# Patient Record
Sex: FEMALE | Race: WHITE | NOT HISPANIC OR LATINO | Employment: OTHER | URBAN - METROPOLITAN AREA
[De-identification: names, ages, dates, MRNs, and addresses within clinical notes are randomized per-mention and may not be internally consistent; named-entity substitution may affect disease eponyms.]

---

## 2018-06-12 ENCOUNTER — CLINICAL SUPPORT (OUTPATIENT)
Dept: AUDIOLOGY | Facility: CLINIC | Age: 63
End: 2018-06-12
Payer: MEDICARE

## 2018-06-12 ENCOUNTER — OFFICE VISIT (OUTPATIENT)
Dept: OTOLARYNGOLOGY | Facility: CLINIC | Age: 63
End: 2018-06-12
Payer: MEDICARE

## 2018-06-12 VITALS — WEIGHT: 260.13 LBS

## 2018-06-12 DIAGNOSIS — H90.6 MIXED CONDUCTIVE AND SENSORINEURAL HEARING LOSS OF BOTH EARS: Primary | ICD-10-CM

## 2018-06-12 DIAGNOSIS — H93.13 TINNITUS OF BOTH EARS: ICD-10-CM

## 2018-06-12 DIAGNOSIS — H90.6 MIXED CONDUCTIVE AND SENSORINEURAL HEARING LOSS, BILATERAL: Primary | ICD-10-CM

## 2018-06-12 DIAGNOSIS — H60.541 ECZEMATOID OTITIS EXTERNA OF RIGHT EAR, UNSPECIFIED CHRONICITY: ICD-10-CM

## 2018-06-12 DIAGNOSIS — H61.22 IMPACTED CERUMEN OF LEFT EAR: ICD-10-CM

## 2018-06-12 DIAGNOSIS — H93.13 TINNITUS, BILATERAL: ICD-10-CM

## 2018-06-12 PROCEDURE — 92567 TYMPANOMETRY: CPT | Mod: PBBFAC | Performed by: AUDIOLOGIST-HEARING AID FITTER

## 2018-06-12 PROCEDURE — 92557 COMPREHENSIVE HEARING TEST: CPT | Mod: PBBFAC | Performed by: AUDIOLOGIST-HEARING AID FITTER

## 2018-06-12 PROCEDURE — 99204 OFFICE O/P NEW MOD 45 MIN: CPT | Mod: 25,S$PBB,, | Performed by: PHYSICIAN ASSISTANT

## 2018-06-12 PROCEDURE — 99213 OFFICE O/P EST LOW 20 MIN: CPT | Mod: PBBFAC,25 | Performed by: PHYSICIAN ASSISTANT

## 2018-06-12 PROCEDURE — 69210 REMOVE IMPACTED EAR WAX UNI: CPT | Mod: PBBFAC | Performed by: PHYSICIAN ASSISTANT

## 2018-06-12 PROCEDURE — 99999 PR PBB SHADOW E&M-EST. PATIENT-LVL III: CPT | Mod: PBBFAC,,, | Performed by: PHYSICIAN ASSISTANT

## 2018-06-12 PROCEDURE — 69210 REMOVE IMPACTED EAR WAX UNI: CPT | Mod: S$PBB,,, | Performed by: PHYSICIAN ASSISTANT

## 2018-06-12 RX ORDER — LOSARTAN POTASSIUM 100 MG/1
100 TABLET ORAL DAILY
COMMUNITY
Start: 2016-03-08 | End: 2021-11-15 | Stop reason: SDUPTHER

## 2018-06-12 RX ORDER — ZIPRASIDONE HYDROCHLORIDE 80 MG/1
80 CAPSULE ORAL NIGHTLY
COMMUNITY
Start: 2016-09-05

## 2018-06-12 RX ORDER — LEVOTHYROXINE SODIUM 150 UG/1
150 TABLET ORAL DAILY
COMMUNITY
Start: 2017-12-27 | End: 2021-10-05 | Stop reason: ALTCHOICE

## 2018-06-12 RX ORDER — CARVEDILOL 12.5 MG/1
6.25 TABLET ORAL 2 TIMES DAILY
COMMUNITY
End: 2021-07-26 | Stop reason: ALTCHOICE

## 2018-06-12 RX ORDER — ZOLPIDEM TARTRATE 10 MG/1
10 TABLET ORAL NIGHTLY PRN
COMMUNITY
End: 2021-10-05

## 2018-06-12 RX ORDER — BUSPIRONE HYDROCHLORIDE 10 MG/1
10 TABLET ORAL 2 TIMES DAILY
COMMUNITY

## 2018-06-12 RX ORDER — ZIPRASIDONE HYDROCHLORIDE 20 MG/1
20 CAPSULE ORAL DAILY
COMMUNITY

## 2018-06-12 RX ORDER — METFORMIN HYDROCHLORIDE 500 MG/1
1 TABLET ORAL 2 TIMES DAILY
COMMUNITY
Start: 2017-12-27 | End: 2021-07-26 | Stop reason: ALTCHOICE

## 2018-06-12 RX ORDER — LIOTHYRONINE SODIUM 25 UG/1
12.5 TABLET ORAL DAILY
COMMUNITY
Start: 2017-07-26 | End: 2021-07-26 | Stop reason: ALTCHOICE

## 2018-06-12 RX ORDER — AMLODIPINE BESYLATE 5 MG/1
1 TABLET ORAL DAILY
Refills: 3 | COMMUNITY
Start: 2018-03-28 | End: 2021-09-15 | Stop reason: SDUPTHER

## 2018-06-12 RX ORDER — SIMVASTATIN 10 MG/1
1 TABLET, FILM COATED ORAL DAILY
COMMUNITY
Start: 2016-08-17 | End: 2021-07-26 | Stop reason: SDUPTHER

## 2018-06-12 RX ORDER — TRIAMCINOLONE ACETONIDE 1 MG/G
CREAM TOPICAL 2 TIMES DAILY
Qty: 15 G | Refills: 1 | Status: SHIPPED | OUTPATIENT
Start: 2018-06-12 | End: 2021-07-26 | Stop reason: ALTCHOICE

## 2018-06-12 NOTE — PROGRESS NOTES
Maria Guadalupe Pope was seen 06/12/2018 for an audiological evaluation.  Patient is accompanied by her .  She reports dryness and swelling of both ear canals for past few weeks; seemed better with Neosporin.  She reports hearing loss that has been gradually progressing over the last 2 years.  She has noted any difference in hearing between the ears, with the right ear being the better hearing ear.  She has noted occasional tinnitus (dull ring) in both ears.  She has not had any recent issues with ear pain or ear drainage.  She has a family history of hearing loss, and has not had any previous otologic surgery.  She denies any history of significant loud noise exposure. She denies issues with dizziness.    Results reveal a normal-to-moderate mixed hearing loss 250-8000 Hz for the right ear, and a normal-to-severe mixed hearing loss 250-8000 Hz for the left ear.   Speech Reception Thresholds were  25 dBHL for the right ear and 30 dBHL for the left ear.   Word recognition scores were excellent for the right ear and good for the left ear.   Tympanograms were Type Ad for the right ear and Type Ad for the left ear.    Patient was counseled on the above findings.    Recommendations:  1. ENT  2. Hearing aids, if and when motivated.  Information packet was provided.  3. Annual audiograms

## 2018-06-12 NOTE — PROGRESS NOTES
Subjective:       Patient ID: Maria Guadalupe Pope is a 62 y.o. female.    Chief Complaint: Ear Fullness and Hearing Loss    Patient is a very pleasant 62 y.o. female here to see me today for the first time for evaluation of her ears.  She reports dryness and swelling of both ear canals for past few weeks; seemed better with Neosporin.  She reports hearing loss that has been gradually progressing over the last 2 years.  She has noted any difference in hearing between the ears, with the right ear being the better hearing ear.  She has noted occasional tinnitus (dull ring) in both ears.  She has not had any recent issues with ear pain or ear drainage.  She says both ears are itchy at times and she uses Qtips after showering.  She has a family history of hearing loss, and has not had any previous otologic surgery.  She denies any history of significant loud noise exposure. She denies issues with dizziness.        Review of Systems   Constitutional: Negative for activity change, appetite change and fever.   HENT: Positive for hearing loss and tinnitus. Negative for congestion, ear discharge, ear pain (itchy AU), nosebleeds, postnasal drip, rhinorrhea, sinus pressure, sore throat and trouble swallowing.    Eyes: Negative for discharge.   Respiratory: Negative for cough, shortness of breath and wheezing.    Cardiovascular: Negative for chest pain and palpitations.   Gastrointestinal: Negative for diarrhea, nausea and vomiting.   Musculoskeletal: Negative for gait problem.   Allergic/Immunologic: Negative for food allergies.   Neurological: Negative for dizziness, light-headedness and headaches.   Hematological: Negative for adenopathy.   Psychiatric/Behavioral: Negative for confusion.       Objective:      Physical Exam   Constitutional: She is oriented to person, place, and time. She appears well-developed and well-nourished. She is cooperative. No distress.   HENT:   Head: Normocephalic and atraumatic.   Right Ear:  Tympanic membrane, external ear and ear canal normal. There is swelling (minimal at EAC meatus ). No tenderness. No middle ear effusion.   Left Ear: External ear and ear canal normal.   Nose: Nose normal. No mucosal edema, rhinorrhea, nasal deformity or septal deviation. No epistaxis. Right sinus exhibits no maxillary sinus tenderness and no frontal sinus tenderness. Left sinus exhibits no maxillary sinus tenderness and no frontal sinus tenderness.   Mouth/Throat: Uvula is midline, oropharynx is clear and moist and mucous membranes are normal. Mucous membranes are not pale and not dry. No trismus in the jaw. Normal dentition. No uvula swelling. No oropharyngeal exudate or posterior oropharyngeal erythema.   Left cerumen impaction (removal described below); right EAC meatus with dry, flaky skin noted with very mild edema; not erythematous; few tiny excoriations noted   Eyes: Conjunctivae, EOM and lids are normal. Pupils are equal, round, and reactive to light. Right eye exhibits no chemosis. Left eye exhibits no chemosis. Right conjunctiva is not injected. Left conjunctiva is not injected. No scleral icterus. Right eye exhibits normal extraocular motion and no nystagmus. Left eye exhibits normal extraocular motion and no nystagmus.   Neck: Trachea normal and phonation normal. No tracheal tenderness present. No tracheal deviation present. No thyroid mass and no thyromegaly present.   Cardiovascular: Intact distal pulses.    Pulmonary/Chest: Effort normal. No stridor. No respiratory distress.   Abdominal: She exhibits no distension.   Lymphadenopathy:        Head (right side): No submental, no submandibular, no preauricular and no posterior auricular adenopathy present.        Head (left side): No submental, no submandibular, no preauricular and no posterior auricular adenopathy present.     She has no cervical adenopathy.   Neurological: She is alert and oriented to person, place, and time. No cranial nerve deficit.    Skin: Skin is warm and dry. No rash noted. No erythema.   Psychiatric: She has a normal mood and affect. Her behavior is normal.         Procedure Note    CHIEF COMPLAINT:  Cerumen Impaction    Description:  The patient was seated in an exam chair.  An ear speculum was placed in the left EAC and was examined under the microscope.  Suction and/or loop curettes were used to remove a large cerumen impaction.  The tympanic membrane was visualized and was normal in appearance.  The patient tolerated the procedure well.      AUDIOGRAM:          Assessment:       1. Mixed conductive and sensorineural hearing loss of both ears    2. Tinnitus of both ears    3. Eczematoid otitis externa of right ear, unspecified chronicity    4. Impacted cerumen of left ear        Plan:         We reviewed the patient's recent audiogram and hearing loss in detail.  Recommend annual audiograms.  We also discussed the use hearing protection when exposed to loud noise, including lawn equipment.      We reviewed her audiogram together in detail.  We also discussed that tinnitus is most often caused by a hearing loss, and that as the hair cells are damaged, either genetic or as a result of loud noise exposure, they then cause tinnitus.  Some patients find that restricting the salt or caffeine in their diet helps, and there is also an OTC supplement, lipoflavinoids, that some people find to be effective though their benefit is not fully proven.  Tinnitus tends to be louder in times of stress and fatigue, and may decrease with time.  Sound machines may also be an effective masking technique if needed at night.    Recommend topical steroid cream (TAC 0.1%) to external ears PRN itching and flaking.  We discussed intermittent use as long-term use can lead to fungal infection of the ears.  We discussed the need for dry ear precautions.  For maintenance therapy, I recommend a mixture of distilled vinegar and alcohol.      Cerumen impaction:  Removed  today without difficulty.  I would recommend the use of a wax softening drop, either over the counter Debrox or mineral oil, on a weekly basis.  I also instructed the patient to avoid Qtips.

## 2021-02-24 ENCOUNTER — IMMUNIZATION (OUTPATIENT)
Dept: OBSTETRICS AND GYNECOLOGY | Facility: CLINIC | Age: 66
End: 2021-02-24
Payer: MEDICARE

## 2021-02-24 DIAGNOSIS — Z23 NEED FOR VACCINATION: Primary | ICD-10-CM

## 2021-02-24 PROCEDURE — 0001A COVID-19, MRNA, LNP-S, PF, 30 MCG/0.3 ML DOSE VACCINE: CPT | Mod: CV19,,, | Performed by: ANESTHESIOLOGY

## 2021-02-24 PROCEDURE — 91300 COVID-19, MRNA, LNP-S, PF, 30 MCG/0.3 ML DOSE VACCINE: CPT | Mod: ,,, | Performed by: ANESTHESIOLOGY

## 2021-02-24 PROCEDURE — 91300 COVID-19, MRNA, LNP-S, PF, 30 MCG/0.3 ML DOSE VACCINE: ICD-10-PCS | Mod: ,,, | Performed by: ANESTHESIOLOGY

## 2021-02-24 PROCEDURE — 0001A COVID-19, MRNA, LNP-S, PF, 30 MCG/0.3 ML DOSE VACCINE: ICD-10-PCS | Mod: CV19,,, | Performed by: ANESTHESIOLOGY

## 2021-03-17 ENCOUNTER — IMMUNIZATION (OUTPATIENT)
Dept: OBSTETRICS AND GYNECOLOGY | Facility: CLINIC | Age: 66
End: 2021-03-17
Payer: MEDICARE

## 2021-03-17 DIAGNOSIS — Z23 NEED FOR VACCINATION: Primary | ICD-10-CM

## 2021-03-17 PROCEDURE — 91300 COVID-19, MRNA, LNP-S, PF, 30 MCG/0.3 ML DOSE VACCINE: CPT | Mod: ,,, | Performed by: ANESTHESIOLOGY

## 2021-03-17 PROCEDURE — 0002A COVID-19, MRNA, LNP-S, PF, 30 MCG/0.3 ML DOSE VACCINE: ICD-10-PCS | Mod: CV19,,, | Performed by: ANESTHESIOLOGY

## 2021-03-17 PROCEDURE — 91300 COVID-19, MRNA, LNP-S, PF, 30 MCG/0.3 ML DOSE VACCINE: ICD-10-PCS | Mod: ,,, | Performed by: ANESTHESIOLOGY

## 2021-03-17 PROCEDURE — 0002A COVID-19, MRNA, LNP-S, PF, 30 MCG/0.3 ML DOSE VACCINE: CPT | Mod: CV19,,, | Performed by: ANESTHESIOLOGY

## 2021-06-10 ENCOUNTER — HOSPITAL ENCOUNTER (OUTPATIENT)
Dept: RADIOLOGY | Facility: HOSPITAL | Age: 66
Discharge: HOME | End: 2021-06-10
Attending: INTERNAL MEDICINE
Payer: MEDICARE

## 2021-06-10 DIAGNOSIS — R60.9 SWELLING: Primary | ICD-10-CM

## 2021-06-10 DIAGNOSIS — R60.9 SWELLING: ICD-10-CM

## 2021-06-10 PROCEDURE — 93971 EXTREMITY STUDY: CPT | Mod: TC,LT

## 2021-07-26 PROBLEM — E66.09 OBESITY DUE TO EXCESS CALORIES: Status: ACTIVE | Noted: 2021-07-26

## 2021-07-26 PROBLEM — J18.9 PNEUMONIA: Status: ACTIVE | Noted: 2021-07-26

## 2021-08-24 ENCOUNTER — LAB VISIT (OUTPATIENT)
Dept: LAB | Facility: HOSPITAL | Age: 66
End: 2021-08-24
Attending: INTERNAL MEDICINE
Payer: MEDICARE

## 2021-08-24 DIAGNOSIS — E03.9 MYXEDEMA HEART DISEASE: ICD-10-CM

## 2021-08-24 DIAGNOSIS — I51.9 MYXEDEMA HEART DISEASE: ICD-10-CM

## 2021-08-24 DIAGNOSIS — I50.20 SYSTOLIC HEART FAILURE, UNSPECIFIED HF CHRONICITY: ICD-10-CM

## 2021-08-24 DIAGNOSIS — E78.2 MIXED HYPERLIPIDEMIA: Primary | ICD-10-CM

## 2021-08-24 DIAGNOSIS — I10 ESSENTIAL HYPERTENSION, BENIGN: ICD-10-CM

## 2021-08-24 LAB
ALBUMIN SERPL BCP-MCNC: 3.3 G/DL (ref 3.5–5.2)
ALP SERPL-CCNC: 71 U/L (ref 55–135)
ALT SERPL W/O P-5'-P-CCNC: 37 U/L (ref 10–44)
ANION GAP SERPL CALC-SCNC: 7 MMOL/L (ref 8–16)
AST SERPL-CCNC: 20 U/L (ref 10–40)
BASOPHILS # BLD AUTO: 0.01 K/UL (ref 0–0.2)
BASOPHILS NFR BLD: 0.2 % (ref 0–1.9)
BILIRUB SERPL-MCNC: 0.4 MG/DL (ref 0.1–1)
BUN SERPL-MCNC: 8 MG/DL (ref 8–23)
CALCIUM SERPL-MCNC: 9.4 MG/DL (ref 8.7–10.5)
CHLORIDE SERPL-SCNC: 108 MMOL/L (ref 95–110)
CHOLEST SERPL-MCNC: 160 MG/DL (ref 120–199)
CHOLEST/HDLC SERPL: 3.5 {RATIO} (ref 2–5)
CO2 SERPL-SCNC: 28 MMOL/L (ref 23–29)
CREAT SERPL-MCNC: 1.6 MG/DL (ref 0.5–1.4)
DIFFERENTIAL METHOD: ABNORMAL
EOSINOPHIL # BLD AUTO: 0.3 K/UL (ref 0–0.5)
EOSINOPHIL NFR BLD: 6.1 % (ref 0–8)
ERYTHROCYTE [DISTWIDTH] IN BLOOD BY AUTOMATED COUNT: 14.5 % (ref 11.5–14.5)
EST. GFR  (AFRICAN AMERICAN): 38.7 ML/MIN/1.73 M^2
EST. GFR  (NON AFRICAN AMERICAN): 33.6 ML/MIN/1.73 M^2
GLUCOSE SERPL-MCNC: 135 MG/DL (ref 70–110)
HCT VFR BLD AUTO: 40.6 % (ref 37–48.5)
HDLC SERPL-MCNC: 46 MG/DL (ref 40–75)
HDLC SERPL: 28.8 % (ref 20–50)
HGB BLD-MCNC: 13.2 G/DL (ref 12–16)
IMM GRANULOCYTES # BLD AUTO: 0.03 K/UL (ref 0–0.04)
IMM GRANULOCYTES NFR BLD AUTO: 0.6 % (ref 0–0.5)
LDLC SERPL CALC-MCNC: 75.2 MG/DL (ref 63–159)
LYMPHOCYTES # BLD AUTO: 1.4 K/UL (ref 1–4.8)
LYMPHOCYTES NFR BLD: 27.9 % (ref 18–48)
MCH RBC QN AUTO: 31.4 PG (ref 27–31)
MCHC RBC AUTO-ENTMCNC: 32.5 G/DL (ref 32–36)
MCV RBC AUTO: 96 FL (ref 82–98)
MONOCYTES # BLD AUTO: 0.4 K/UL (ref 0.3–1)
MONOCYTES NFR BLD: 7.7 % (ref 4–15)
NEUTROPHILS # BLD AUTO: 2.9 K/UL (ref 1.8–7.7)
NEUTROPHILS NFR BLD: 57.5 % (ref 38–73)
NONHDLC SERPL-MCNC: 114 MG/DL
NRBC BLD-RTO: 0 /100 WBC
PLATELET # BLD AUTO: 177 K/UL (ref 150–450)
PMV BLD AUTO: 9.5 FL (ref 9.2–12.9)
POTASSIUM SERPL-SCNC: 4.4 MMOL/L (ref 3.5–5.1)
PROT SERPL-MCNC: 7 G/DL (ref 6–8.4)
RBC # BLD AUTO: 4.21 M/UL (ref 4–5.4)
SODIUM SERPL-SCNC: 143 MMOL/L (ref 136–145)
T4 FREE SERPL-MCNC: 0.85 NG/DL (ref 0.71–1.51)
TRIGL SERPL-MCNC: 194 MG/DL (ref 30–150)
TSH SERPL DL<=0.005 MIU/L-ACNC: 7.4 UIU/ML (ref 0.4–4)
WBC # BLD AUTO: 5.09 K/UL (ref 3.9–12.7)

## 2021-08-24 PROCEDURE — 85025 COMPLETE CBC W/AUTO DIFF WBC: CPT | Performed by: INTERNAL MEDICINE

## 2021-08-24 PROCEDURE — 36415 COLL VENOUS BLD VENIPUNCTURE: CPT | Performed by: INTERNAL MEDICINE

## 2021-08-24 PROCEDURE — 80053 COMPREHEN METABOLIC PANEL: CPT | Performed by: INTERNAL MEDICINE

## 2021-08-24 PROCEDURE — 84443 ASSAY THYROID STIM HORMONE: CPT | Performed by: INTERNAL MEDICINE

## 2021-08-24 PROCEDURE — 80061 LIPID PANEL: CPT | Performed by: INTERNAL MEDICINE

## 2021-08-24 PROCEDURE — 84439 ASSAY OF FREE THYROXINE: CPT | Performed by: INTERNAL MEDICINE

## 2021-09-08 PROBLEM — U07.1 COVID-19: Status: ACTIVE | Noted: 2021-09-08

## 2021-09-08 PROBLEM — E78.2 ELEVATED CHOLESTEROL WITH HIGH TRIGLYCERIDES: Status: ACTIVE | Noted: 2021-09-08

## 2021-09-08 PROBLEM — E03.9 HYPOTHYROIDISM: Status: ACTIVE | Noted: 2021-09-08

## 2021-10-22 ENCOUNTER — IMMUNIZATION (OUTPATIENT)
Dept: OBSTETRICS AND GYNECOLOGY | Facility: CLINIC | Age: 66
End: 2021-10-22
Payer: MEDICARE

## 2021-10-22 DIAGNOSIS — Z23 NEED FOR VACCINATION: Primary | ICD-10-CM

## 2021-10-22 PROCEDURE — 0003A COVID-19, MRNA, LNP-S, PF, 30 MCG/0.3 ML DOSE VACCINE: CPT | Mod: PBBFAC | Performed by: ANESTHESIOLOGY

## 2021-10-22 PROCEDURE — 91300 COVID-19, MRNA, LNP-S, PF, 30 MCG/0.3 ML DOSE VACCINE: CPT | Mod: PBBFAC

## 2021-12-02 PROBLEM — E88.89 DEFICIENCY OF COENZYME Q10: Status: ACTIVE | Noted: 2021-12-02

## 2021-12-02 PROBLEM — N18.32 STAGE 3B CHRONIC KIDNEY DISEASE: Status: ACTIVE | Noted: 2021-12-02

## 2021-12-02 PROBLEM — Z00.00 ROUTINE GENERAL MEDICAL EXAMINATION AT A HEALTH CARE FACILITY: Status: ACTIVE | Noted: 2021-12-02

## 2021-12-02 PROBLEM — I10 HYPERTENSION: Status: ACTIVE | Noted: 2021-12-02

## 2021-12-02 PROBLEM — R73.01 IFG (IMPAIRED FASTING GLUCOSE): Status: ACTIVE | Noted: 2021-12-02

## 2021-12-02 PROBLEM — E63.0 ESSENTIAL FATTY ACID (EFA) DEFICIENCY: Status: ACTIVE | Noted: 2021-12-02

## 2022-03-07 PROBLEM — Z00.00 ROUTINE GENERAL MEDICAL EXAMINATION AT A HEALTH CARE FACILITY: Status: RESOLVED | Noted: 2021-12-02 | Resolved: 2022-03-07

## 2022-05-12 ENCOUNTER — OFFICE VISIT (OUTPATIENT)
Dept: ENDOCRINOLOGY | Facility: CLINIC | Age: 67
End: 2022-05-12
Payer: MEDICARE

## 2022-05-12 VITALS
BODY MASS INDEX: 46.06 KG/M2 | HEIGHT: 65 IN | WEIGHT: 276.44 LBS | DIASTOLIC BLOOD PRESSURE: 72 MMHG | SYSTOLIC BLOOD PRESSURE: 138 MMHG

## 2022-05-12 DIAGNOSIS — E78.2 ELEVATED CHOLESTEROL WITH HIGH TRIGLYCERIDES: ICD-10-CM

## 2022-05-12 DIAGNOSIS — E03.8 HYPOTHYROIDISM DUE TO HASHIMOTO'S THYROIDITIS: Primary | ICD-10-CM

## 2022-05-12 DIAGNOSIS — E06.3 HYPOTHYROIDISM DUE TO HASHIMOTO'S THYROIDITIS: Primary | ICD-10-CM

## 2022-05-12 DIAGNOSIS — N18.32 STAGE 3B CHRONIC KIDNEY DISEASE: ICD-10-CM

## 2022-05-12 PROCEDURE — 99999 PR PBB SHADOW E&M-EST. PATIENT-LVL III: ICD-10-PCS | Mod: PBBFAC,,, | Performed by: STUDENT IN AN ORGANIZED HEALTH CARE EDUCATION/TRAINING PROGRAM

## 2022-05-12 PROCEDURE — 99999 PR PBB SHADOW E&M-EST. PATIENT-LVL III: CPT | Mod: PBBFAC,,, | Performed by: STUDENT IN AN ORGANIZED HEALTH CARE EDUCATION/TRAINING PROGRAM

## 2022-05-12 PROCEDURE — 99213 OFFICE O/P EST LOW 20 MIN: CPT | Mod: PBBFAC,PN | Performed by: STUDENT IN AN ORGANIZED HEALTH CARE EDUCATION/TRAINING PROGRAM

## 2022-05-12 PROCEDURE — 99204 OFFICE O/P NEW MOD 45 MIN: CPT | Mod: S$PBB,,, | Performed by: STUDENT IN AN ORGANIZED HEALTH CARE EDUCATION/TRAINING PROGRAM

## 2022-05-12 PROCEDURE — 99204 PR OFFICE/OUTPT VISIT, NEW, LEVL IV, 45-59 MIN: ICD-10-PCS | Mod: S$PBB,,, | Performed by: STUDENT IN AN ORGANIZED HEALTH CARE EDUCATION/TRAINING PROGRAM

## 2022-05-12 RX ORDER — CELECOXIB 100 MG/1
CAPSULE ORAL
COMMUNITY

## 2022-05-12 NOTE — PROGRESS NOTES
"Subjective:      Patient ID: Maria Guadalupe Pope is a 66 y.o. female.    Chief Complaint:  Hypothyroidism    History of Present Illness  This is a 66 y.o. female. with a past medical history of hypothyroidism here for evaluation.    Hypothyroidism  Diagnosed in her 20s    Etiology: Autoimmune    Currently on levothyroxine 175 mcg daily and Cytomel 12.5 mcg daily  Reports takes at least 30 min before eating or drinking anything other than water.   Reports taking separate from multivitamins by at least 4 hours    Lab Results   Component Value Date    TSH 7.400 (H) 08/24/2021     Outside labs (11/16/21)  TSH 0.6  FT4 1.25    She reports having issues with different levothyroxine brands  At some point was on Euthyrox which did not work  Only 1 week ago was able to get levothyroxine generic which is the one that has worked as per her    Reports some neck tightness at times    Lithium use: No  Biotin use: No    Thyroid US: not recently    Review of Systems  As above    Social and family history reviewed  Current medications and allergies reviewed    Objective:   /72 (BP Location: Right arm, Patient Position: Sitting)   Ht 5' 5" (1.651 m)   Wt 125.4 kg (276 lb 7.3 oz)   BMI 46.00 kg/m²   Physical Exam  Alert, oriented  No thyromegaly    BP Readings from Last 1 Encounters:   05/12/22 138/72      Wt Readings from Last 1 Encounters:   05/12/22 1419 125.4 kg (276 lb 7.3 oz)     Body mass index is 46 kg/m².    Lab Review:   Lab Results   Component Value Date    HGBA1C 5.8 (H) 11/16/2021     Lab Results   Component Value Date    CHOL 144 11/16/2021    HDL 38 (L) 11/16/2021    LDLCALC 79 11/16/2021    TRIG 156 (H) 11/16/2021    CHOLHDL 28.8 08/24/2021     Lab Results   Component Value Date     11/16/2021    K 4.8 11/16/2021     11/16/2021    CO2 29 11/16/2021     (H) 11/16/2021    BUN 14 11/16/2021    CREATININE 1.51 (H) 11/16/2021    CALCIUM 9.7 11/16/2021    PROT 7.0 08/24/2021    ALBUMIN 4.3 " 11/16/2021    BILITOT 0.4 11/16/2021    ALKPHOS 71 08/24/2021    AST 27 11/16/2021    ALT 22 11/16/2021    ANIONGAP 7 (L) 08/24/2021    ESTGFRAFRICA 38.7 (A) 08/24/2021    EGFRNONAA 36 (L) 11/16/2021    TSH 7.400 (H) 08/24/2021       All pertinent labs reviewed    Assessment and Plan     Hypothyroidism  No recent TSH  Given recent changes in formulation will avoid checking TSH now and will wait until 6 weeks after she started new regimen/formulation  For now continue same regimen as prescribed  She is having some neck/throat tightness, no goiter on exam, will perform US    Plan  - Check TSH in 5 weeks with follow up a few days after  - Thyroid US    Elevated cholesterol with high triglycerides  On statin per PCP  Optimize thyroid function as above    Stage 3b chronic kidney disease  Continue antihypertensive regimen including ARB/ACEi      Follow-up in 6 weeks    Supa Arias MD  Endocrinology

## 2022-05-12 NOTE — ASSESSMENT & PLAN NOTE
No recent TSH  Given recent changes in formulation will avoid checking TSH now and will wait until 6 weeks after she started new regimen/formulation  For now continue same regimen as prescribed  She is having some neck/throat tightness, no goiter on exam, will perform US    Plan  - Check TSH in 5 weeks with follow up a few days after  - Thyroid US

## 2022-05-17 ENCOUNTER — HOSPITAL ENCOUNTER (OUTPATIENT)
Dept: RADIOLOGY | Facility: HOSPITAL | Age: 67
Discharge: HOME | End: 2022-05-17
Attending: STUDENT IN AN ORGANIZED HEALTH CARE EDUCATION/TRAINING PROGRAM
Payer: MEDICARE

## 2022-05-17 DIAGNOSIS — E03.8 HYPOTHYROIDISM DUE TO HASHIMOTO'S THYROIDITIS: ICD-10-CM

## 2022-05-17 DIAGNOSIS — E06.3 HYPOTHYROIDISM DUE TO HASHIMOTO'S THYROIDITIS: ICD-10-CM

## 2022-05-17 PROCEDURE — 76536 US EXAM OF HEAD AND NECK: CPT | Mod: TC

## 2022-06-17 ENCOUNTER — LAB VISIT (OUTPATIENT)
Dept: LAB | Facility: HOSPITAL | Age: 67
End: 2022-06-17
Attending: STUDENT IN AN ORGANIZED HEALTH CARE EDUCATION/TRAINING PROGRAM
Payer: MEDICARE

## 2022-06-17 DIAGNOSIS — E06.3 HYPOTHYROIDISM DUE TO HASHIMOTO'S THYROIDITIS: ICD-10-CM

## 2022-06-17 DIAGNOSIS — E03.8 HYPOTHYROIDISM DUE TO HASHIMOTO'S THYROIDITIS: ICD-10-CM

## 2022-06-17 LAB
T4 FREE SERPL-MCNC: 1.18 NG/DL (ref 0.71–1.51)
TSH SERPL DL<=0.005 MIU/L-ACNC: 0.02 UIU/ML (ref 0.4–4)

## 2022-06-17 PROCEDURE — 36415 COLL VENOUS BLD VENIPUNCTURE: CPT | Performed by: STUDENT IN AN ORGANIZED HEALTH CARE EDUCATION/TRAINING PROGRAM

## 2022-06-17 PROCEDURE — 84443 ASSAY THYROID STIM HORMONE: CPT | Performed by: STUDENT IN AN ORGANIZED HEALTH CARE EDUCATION/TRAINING PROGRAM

## 2022-06-17 PROCEDURE — 84439 ASSAY OF FREE THYROXINE: CPT | Performed by: STUDENT IN AN ORGANIZED HEALTH CARE EDUCATION/TRAINING PROGRAM

## 2022-06-21 ENCOUNTER — OFFICE VISIT (OUTPATIENT)
Dept: ENDOCRINOLOGY | Facility: CLINIC | Age: 67
End: 2022-06-21
Payer: MEDICARE

## 2022-06-21 VITALS
DIASTOLIC BLOOD PRESSURE: 82 MMHG | HEIGHT: 65 IN | SYSTOLIC BLOOD PRESSURE: 134 MMHG | BODY MASS INDEX: 45.43 KG/M2 | WEIGHT: 272.69 LBS

## 2022-06-21 DIAGNOSIS — E03.9 ACQUIRED HYPOTHYROIDISM: Primary | ICD-10-CM

## 2022-06-21 DIAGNOSIS — E78.2 ELEVATED CHOLESTEROL WITH HIGH TRIGLYCERIDES: ICD-10-CM

## 2022-06-21 PROCEDURE — 99214 OFFICE O/P EST MOD 30 MIN: CPT | Mod: S$PBB,,, | Performed by: STUDENT IN AN ORGANIZED HEALTH CARE EDUCATION/TRAINING PROGRAM

## 2022-06-21 PROCEDURE — 99999 PR PBB SHADOW E&M-EST. PATIENT-LVL III: ICD-10-PCS | Mod: PBBFAC,,, | Performed by: STUDENT IN AN ORGANIZED HEALTH CARE EDUCATION/TRAINING PROGRAM

## 2022-06-21 PROCEDURE — 99999 PR PBB SHADOW E&M-EST. PATIENT-LVL III: CPT | Mod: PBBFAC,,, | Performed by: STUDENT IN AN ORGANIZED HEALTH CARE EDUCATION/TRAINING PROGRAM

## 2022-06-21 PROCEDURE — 99214 PR OFFICE/OUTPT VISIT, EST, LEVL IV, 30-39 MIN: ICD-10-PCS | Mod: S$PBB,,, | Performed by: STUDENT IN AN ORGANIZED HEALTH CARE EDUCATION/TRAINING PROGRAM

## 2022-06-21 PROCEDURE — 99213 OFFICE O/P EST LOW 20 MIN: CPT | Mod: PBBFAC,PN | Performed by: STUDENT IN AN ORGANIZED HEALTH CARE EDUCATION/TRAINING PROGRAM

## 2022-06-21 RX ORDER — LEVOTHYROXINE SODIUM 175 UG/1
TABLET ORAL
COMMUNITY
End: 2022-07-02 | Stop reason: SDUPTHER

## 2022-06-21 RX ORDER — MELOXICAM 7.5 MG/1
TABLET ORAL
COMMUNITY

## 2022-06-21 NOTE — ASSESSMENT & PLAN NOTE
TSH below goal, however last TSH had been at goal on same dose  She is feeling good so will keep same dose for now and repeat TSH in 4 weeks  TSH was high on levothyroxine 150 mcg + Cytomel 12.5, so:  If TSH remains low will lower Cytomel from 12.5 to 10 mcg daily and keep same levothyroxine dose

## 2022-06-21 NOTE — PROGRESS NOTES
"Subjective:      Patient ID: Maria Guadalupe Pope is a 66 y.o. female.    Chief Complaint:  Hypothyroidism    History of Present Illness  This is a 66 y.o. female. with a past medical history of hypothyroidism here for evaluation.    Hypothyroidism  Diagnosed in her 20s  Currently on levothyroxine (generic) 175 mcg daily and Cytomel 12.5 mcg daily  Reports takes at least 30 min before eating or drinking anything other than water.   Reports taking separate from multivitamins by at least 4 hours    Lab Results   Component Value Date    TSH 0.022 (L) 06/17/2022       Wt Readings from Last 5 Encounters:   06/21/22 123.7 kg (272 lb 11.3 oz)   05/12/22 125.4 kg (276 lb 7.3 oz)   12/02/21 123.4 kg (272 lb)   10/05/21 123.4 kg (272 lb)   07/26/21 124.7 kg (275 lb)       Outside labs (11/16/21)  TSH 0.6  FT4 1.25    She reports having issues with different levothyroxine brands - feels like the one that works best is generic    Lithium use: No  Biotin use: No    Thyroid US (May 2022)  The right thyroid lobe measures 2.2 x 1 x 0.6 cm and demonstrates heterogeneous echotexture with no focal lesion.  Left thyroid lobe measures 2.5 x 0.8 x 1.1 cm and demonstrates heterogeneous echotexture.  There is a solid hypoechoic nodule in the mid aspect of the lobe measuring 0.5 cm.  Thyroid isthmus measures 0.2 cm in thickness and demonstrates no abnormality.  Impression:  A solid 0.5 cm nodule in left thyroid lobe with no suspicious features.  Diffuse heterogeneous echotexture of the thyroid gland, nonspecific and could reflect sequela of chronic thyroiditis.      Review of Systems  As above    Social and family history reviewed  Current medications and allergies reviewed    Objective:   /82 (BP Location: Right arm, Patient Position: Sitting)   Ht 5' 5" (1.651 m)   Wt 123.7 kg (272 lb 11.3 oz)   BMI 45.38 kg/m²   Physical Exam  Alert, oriented  No thyromegaly    BP Readings from Last 1 Encounters:   06/21/22 134/82      Wt Readings " from Last 1 Encounters:   06/21/22 1440 123.7 kg (272 lb 11.3 oz)     Body mass index is 45.38 kg/m².    Lab Review:   Lab Results   Component Value Date    HGBA1C 5.8 (H) 11/16/2021     Lab Results   Component Value Date    CHOL 144 11/16/2021    HDL 38 (L) 11/16/2021    LDLCALC 79 11/16/2021    TRIG 156 (H) 11/16/2021    CHOLHDL 28.8 08/24/2021     Lab Results   Component Value Date     11/16/2021    K 4.8 11/16/2021     11/16/2021    CO2 29 11/16/2021     (H) 11/16/2021    BUN 14 11/16/2021    CREATININE 1.51 (H) 11/16/2021    CALCIUM 9.7 11/16/2021    PROT 7.0 08/24/2021    ALBUMIN 4.3 11/16/2021    BILITOT 0.4 11/16/2021    ALKPHOS 71 08/24/2021    AST 27 11/16/2021    ALT 22 11/16/2021    ANIONGAP 7 (L) 08/24/2021    ESTGFRAFRICA 38.7 (A) 08/24/2021    EGFRNONAA 36 (L) 11/16/2021    TSH 0.022 (L) 06/17/2022       All pertinent labs reviewed    Assessment and Plan     Acquired hypothyroidism  TSH below goal, however last TSH had been at goal on same dose  She is feeling good so will keep same dose for now and repeat TSH in 4 weeks  TSH was high on levothyroxine 150 mcg + Cytomel 12.5, so:  If TSH remains low will lower Cytomel from 12.5 to 10 mcg daily and keep same levothyroxine dose    BMI 45.0-49.9, adult  Optimize thyroid function    Elevated cholesterol with high triglycerides  Continue statin    Follow-up in 3months    Supa Arias MD  Endocrinology

## 2022-06-30 ENCOUNTER — TELEPHONE (OUTPATIENT)
Dept: ENDOCRINOLOGY | Facility: CLINIC | Age: 67
End: 2022-06-30
Payer: MEDICARE

## 2022-06-30 NOTE — TELEPHONE ENCOUNTER
Patient called to get refill on synthroid meds. I sent Dr Hugo LATIF a message to let her know of this request.

## 2022-07-02 DIAGNOSIS — E03.9 ACQUIRED HYPOTHYROIDISM: ICD-10-CM

## 2022-07-02 RX ORDER — LEVOTHYROXINE SODIUM 175 UG/1
175 TABLET ORAL DAILY
Qty: 90 TABLET | Refills: 3 | Status: SHIPPED | OUTPATIENT
Start: 2022-07-02 | End: 2022-08-01 | Stop reason: SDUPTHER

## 2022-07-21 ENCOUNTER — LAB VISIT (OUTPATIENT)
Dept: LAB | Facility: HOSPITAL | Age: 67
End: 2022-07-21
Attending: STUDENT IN AN ORGANIZED HEALTH CARE EDUCATION/TRAINING PROGRAM
Payer: MEDICARE

## 2022-07-21 DIAGNOSIS — E03.9 ACQUIRED HYPOTHYROIDISM: ICD-10-CM

## 2022-07-21 LAB
T4 FREE SERPL-MCNC: 1.29 NG/DL (ref 0.71–1.51)
TSH SERPL DL<=0.005 MIU/L-ACNC: 0.01 UIU/ML (ref 0.4–4)

## 2022-07-21 PROCEDURE — 36415 COLL VENOUS BLD VENIPUNCTURE: CPT | Performed by: STUDENT IN AN ORGANIZED HEALTH CARE EDUCATION/TRAINING PROGRAM

## 2022-07-21 PROCEDURE — 84439 ASSAY OF FREE THYROXINE: CPT | Performed by: STUDENT IN AN ORGANIZED HEALTH CARE EDUCATION/TRAINING PROGRAM

## 2022-07-21 PROCEDURE — 84443 ASSAY THYROID STIM HORMONE: CPT | Performed by: STUDENT IN AN ORGANIZED HEALTH CARE EDUCATION/TRAINING PROGRAM

## 2022-07-22 DIAGNOSIS — E03.9 ACQUIRED HYPOTHYROIDISM: ICD-10-CM

## 2022-07-22 RX ORDER — LIOTHYRONINE SODIUM 5 UG/1
10 TABLET ORAL DAILY
Qty: 60 TABLET | Refills: 11 | Status: SHIPPED | OUTPATIENT
Start: 2022-07-22 | End: 2022-09-22 | Stop reason: SDUPTHER

## 2022-08-01 DIAGNOSIS — E03.9 ACQUIRED HYPOTHYROIDISM: ICD-10-CM

## 2022-08-01 RX ORDER — LEVOTHYROXINE SODIUM 175 UG/1
175 TABLET ORAL DAILY
Qty: 90 TABLET | Refills: 3 | Status: SHIPPED | OUTPATIENT
Start: 2022-08-01 | End: 2022-08-02 | Stop reason: SDUPTHER

## 2022-08-02 DIAGNOSIS — E03.9 ACQUIRED HYPOTHYROIDISM: ICD-10-CM

## 2022-08-02 RX ORDER — LEVOTHYROXINE SODIUM 175 UG/1
175 TABLET ORAL DAILY
Qty: 90 TABLET | Refills: 3 | Status: SHIPPED | OUTPATIENT
Start: 2022-08-02

## 2022-09-15 ENCOUNTER — DOCUMENTATION ONLY (OUTPATIENT)
Dept: ENDOCRINOLOGY | Facility: CLINIC | Age: 67
End: 2022-09-15
Payer: MEDICARE

## 2022-09-15 ENCOUNTER — TELEPHONE (OUTPATIENT)
Dept: ENDOCRINOLOGY | Facility: CLINIC | Age: 67
End: 2022-09-15
Payer: MEDICARE

## 2022-09-15 NOTE — LETTER
September 15, 2022      [facility]  [phone]             [facility]  [address] [facility], [address] [address]  Phone: [phone]  Fax: [phone]   Patient: Maria Guadalupe Pope   MR Number: [id number]   YOB: 1955   Date of Visit: 9/15/2022       Dear provider:    Attached you will find relevant portions of my assessment and plan of care.    If you have questions, please do not hesitate to call me. I look forward to following Maria Guadalupe Pope along with you.    Sincerely,      Supa Arias MD            CC    No Recipients    Enclosure

## 2022-09-15 NOTE — CARE UPDATE
I follow this patient for hypothyroidism treated with levothyroxine and liothyronine.     We have been adjusting doses due to changes in TSH but overall has been stable clinically. She has a normal FT4 which puts her at very low risk of perioperative complications from a thyroid standpoint.    From my perspective, she can undergo a knee replacement.         Latest Reference Range & Units 06/17/22 14:45 07/21/22 14:01   TSH 0.400 - 4.000 uIU/mL 0.022 (L) 0.015 (L)   Free T4 0.71 - 1.51 ng/dL 1.18 1.29       Supa Arias MD  Endocrinology

## 2022-09-21 ENCOUNTER — LAB VISIT (OUTPATIENT)
Dept: LAB | Facility: HOSPITAL | Age: 67
End: 2022-09-21
Attending: STUDENT IN AN ORGANIZED HEALTH CARE EDUCATION/TRAINING PROGRAM
Payer: MEDICARE

## 2022-09-21 DIAGNOSIS — E03.9 ACQUIRED HYPOTHYROIDISM: ICD-10-CM

## 2022-09-21 LAB
T4 FREE SERPL-MCNC: 1.25 NG/DL (ref 0.71–1.51)
TSH SERPL DL<=0.005 MIU/L-ACNC: 0.02 UIU/ML (ref 0.4–4)

## 2022-09-21 PROCEDURE — 36415 COLL VENOUS BLD VENIPUNCTURE: CPT | Performed by: STUDENT IN AN ORGANIZED HEALTH CARE EDUCATION/TRAINING PROGRAM

## 2022-09-21 PROCEDURE — 84439 ASSAY OF FREE THYROXINE: CPT | Performed by: STUDENT IN AN ORGANIZED HEALTH CARE EDUCATION/TRAINING PROGRAM

## 2022-09-21 PROCEDURE — 84443 ASSAY THYROID STIM HORMONE: CPT | Performed by: STUDENT IN AN ORGANIZED HEALTH CARE EDUCATION/TRAINING PROGRAM

## 2022-09-22 ENCOUNTER — OFFICE VISIT (OUTPATIENT)
Dept: ENDOCRINOLOGY | Facility: CLINIC | Age: 67
End: 2022-09-22
Payer: MEDICARE

## 2022-09-22 DIAGNOSIS — I10 PRIMARY HYPERTENSION: ICD-10-CM

## 2022-09-22 DIAGNOSIS — E03.9 ACQUIRED HYPOTHYROIDISM: ICD-10-CM

## 2022-09-22 PROCEDURE — 99214 OFFICE O/P EST MOD 30 MIN: CPT | Mod: S$PBB,,, | Performed by: STUDENT IN AN ORGANIZED HEALTH CARE EDUCATION/TRAINING PROGRAM

## 2022-09-22 PROCEDURE — 99212 OFFICE O/P EST SF 10 MIN: CPT | Mod: PBBFAC,PN | Performed by: STUDENT IN AN ORGANIZED HEALTH CARE EDUCATION/TRAINING PROGRAM

## 2022-09-22 PROCEDURE — 99214 PR OFFICE/OUTPT VISIT, EST, LEVL IV, 30-39 MIN: ICD-10-PCS | Mod: S$PBB,,, | Performed by: STUDENT IN AN ORGANIZED HEALTH CARE EDUCATION/TRAINING PROGRAM

## 2022-09-22 PROCEDURE — 99999 PR PBB SHADOW E&M-EST. PATIENT-LVL II: CPT | Mod: PBBFAC,,, | Performed by: STUDENT IN AN ORGANIZED HEALTH CARE EDUCATION/TRAINING PROGRAM

## 2022-09-22 PROCEDURE — 99999 PR PBB SHADOW E&M-EST. PATIENT-LVL II: ICD-10-PCS | Mod: PBBFAC,,, | Performed by: STUDENT IN AN ORGANIZED HEALTH CARE EDUCATION/TRAINING PROGRAM

## 2022-09-22 RX ORDER — LIOTHYRONINE SODIUM 5 UG/1
5 TABLET ORAL DAILY
Qty: 30 TABLET | Refills: 11 | Status: SHIPPED | OUTPATIENT
Start: 2022-09-22 | End: 2023-09-22

## 2022-09-22 NOTE — PROGRESS NOTES
Subjective:      Patient ID: Maria Guadalupe Pope is a 67 y.o. female.    Chief Complaint:  Hypothyroidism    History of Present Illness  This is a 67 y.o. female. with a past medical history of hypothyroidism here for evaluation.    Hypothyroidism  Diagnosed in her 20s  Currently on levothyroxine (generic) 175 mcg daily and Cytomel 10 mcg daily  Reports takes at least 30 min before eating or drinking anything other than water.   Reports taking separate from multivitamins by at least 4 hours    Lab Results   Component Value Date    TSH 0.018 (L) 09/21/2022       Wt Readings from Last 5 Encounters:   06/21/22 123.7 kg (272 lb 11.3 oz)   05/12/22 125.4 kg (276 lb 7.3 oz)   12/02/21 123.4 kg (272 lb)   10/05/21 123.4 kg (272 lb)   07/26/21 124.7 kg (275 lb)         Outside labs (11/16/21)  TSH 0.6  FT4 1.25    She reports having issues with different levothyroxine brands - feels like the one that works best is generic    Lithium use: No  Biotin use: No    Thyroid US (May 2022)  The right thyroid lobe measures 2.2 x 1 x 0.6 cm and demonstrates heterogeneous echotexture with no focal lesion.  Left thyroid lobe measures 2.5 x 0.8 x 1.1 cm and demonstrates heterogeneous echotexture.  There is a solid hypoechoic nodule in the mid aspect of the lobe measuring 0.5 cm.  Thyroid isthmus measures 0.2 cm in thickness and demonstrates no abnormality.  Impression:  A solid 0.5 cm nodule in left thyroid lobe with no suspicious features.  Diffuse heterogeneous echotexture of the thyroid gland, nonspecific and could reflect sequela of chronic thyroiditis.      Review of Systems  As above    Social and family history reviewed  Current medications and allergies reviewed    Objective:   There were no vitals taken for this visit.  Physical Exam  Alert, oriented  No thyromegaly    BP Readings from Last 1 Encounters:   06/21/22 134/82      Wt Readings from Last 1 Encounters:   06/21/22 1440 123.7 kg (272 lb 11.3 oz)     There is no height or  weight on file to calculate BMI.    Lab Review:   Lab Results   Component Value Date    HGBA1C 5.8 (H) 11/16/2021     Lab Results   Component Value Date    CHOL 144 11/16/2021    HDL 38 (L) 11/16/2021    LDLCALC 79 11/16/2021    TRIG 156 (H) 11/16/2021    CHOLHDL 28.8 08/24/2021     Lab Results   Component Value Date     11/16/2021    K 4.8 11/16/2021     11/16/2021    CO2 29 11/16/2021     (H) 11/16/2021    BUN 14 11/16/2021    CREATININE 1.51 (H) 11/16/2021    CALCIUM 9.7 11/16/2021    PROT 7.0 08/24/2021    ALBUMIN 4.3 11/16/2021    BILITOT 0.4 11/16/2021    ALKPHOS 71 08/24/2021    AST 27 11/16/2021    ALT 22 11/16/2021    ANIONGAP 7 (L) 08/24/2021    ESTGFRAFRICA 38.7 (A) 08/24/2021    EGFRNONAA 36 (L) 11/16/2021    TSH 0.018 (L) 09/21/2022       All pertinent labs reviewed    Assessment and Plan     Acquired hypothyroidism  TSH below goal - will reduce Cytomel further  She feels well for the most part    Plan  - Continue levothyroxine 175 mcg daily  - Decrease Cytomel to 5 mcg daily    TSH in 6 and 12 weeks    F/u 12 weeks    BMI 45.0-49.9, adult  Optimize thyroid function    Hypertension  Continue antihypertensive regimen including ARB/ACEi    Follow-up in 3 months    Supa Arias MD  Endocrinology

## 2022-09-22 NOTE — ASSESSMENT & PLAN NOTE
TSH below goal - will reduce Cytomel further  She feels well for the most part    Plan  - Continue levothyroxine 175 mcg daily  - Decrease Cytomel to 5 mcg daily    TSH in 6 and 12 weeks    F/u 12 weeks